# Patient Record
Sex: FEMALE | Race: OTHER | URBAN - METROPOLITAN AREA
[De-identification: names, ages, dates, MRNs, and addresses within clinical notes are randomized per-mention and may not be internally consistent; named-entity substitution may affect disease eponyms.]

---

## 2017-09-25 NOTE — PATIENT DISCUSSION
Discussed the risks/benefits of laser capsulotomy. Laser recommended. Follow for increased symptoms.

## 2018-10-10 NOTE — PROCEDURE NOTE: CLINICAL
PROCEDURE NOTE: Excision Chalazion, Single Left Upper Lid. Diagnosis: Chalazion. Prior to treatment, the risks/benefits/alternatives were discussed. The patient wished to proceed with procedure. After the risks, benefits, and alternatives to the procedure were discussed with the patient, informed consent was obtained. The patient, the procedure, and the correct site were identified beforehand. Local anesthesia was applied and the lid was prepped. The lid was clamped exposing the posterior surface on the eyelid. The chalazion was incised and removed with a curette. Bleeding was controlled and the clamp was removed. The patient tolerated the procedure well and left the room in good condition. Post procedure instructions given. John Knott PROCEDURE NOTE: Chalazion Injection Left Upper Lid. Diagnosis: Chalazion. Prior to treatment, the risks/benefits/alternatives were discussed. The patient wished to proceed with procedure. Site of Injection: *. Kenalog *units injected. Patient tolerated procedure well. There were no complications. Post procedure instructions given. John Knott

## 2019-01-14 ENCOUNTER — OFFICE VISIT (OUTPATIENT)
Dept: ORTHOPEDIC SURGERY | Facility: CLINIC | Age: 69
End: 2019-01-14

## 2019-01-14 VITALS
RESPIRATION RATE: 16 BRPM | WEIGHT: 224 LBS | BODY MASS INDEX: 36 KG/M2 | HEIGHT: 66 IN | SYSTOLIC BLOOD PRESSURE: 146 MMHG | DIASTOLIC BLOOD PRESSURE: 84 MMHG | OXYGEN SATURATION: 99 % | TEMPERATURE: 95.8 F | HEART RATE: 74 BPM

## 2019-01-14 DIAGNOSIS — M12.811 ROTATOR CUFF ARTHROPATHY OF RIGHT SHOULDER: ICD-10-CM

## 2019-01-14 DIAGNOSIS — G89.29 CHRONIC RIGHT SHOULDER PAIN: ICD-10-CM

## 2019-01-14 DIAGNOSIS — M25.511 CHRONIC RIGHT SHOULDER PAIN: ICD-10-CM

## 2019-01-14 DIAGNOSIS — M19.011 PRIMARY OSTEOARTHRITIS OF RIGHT SHOULDER: Primary | ICD-10-CM

## 2019-01-14 DIAGNOSIS — M75.01 ADHESIVE CAPSULITIS OF RIGHT SHOULDER: ICD-10-CM

## 2019-01-14 PROBLEM — E66.01 SEVERE OBESITY (HCC): Status: ACTIVE | Noted: 2019-01-14

## 2019-01-14 RX ORDER — HYDROCHLOROTHIAZIDE 25 MG/1
TABLET ORAL
Refills: 1 | COMMUNITY
Start: 2019-01-05

## 2019-01-14 RX ORDER — BUPIVACAINE HYDROCHLORIDE 2.5 MG/ML
4 INJECTION, SOLUTION EPIDURAL; INFILTRATION; INTRACAUDAL ONCE
Qty: 4 ML | Refills: 0
Start: 2019-01-14 | End: 2019-01-14

## 2019-01-14 RX ORDER — ENALAPRIL MALEATE 20 MG/1
TABLET ORAL
Refills: 1 | COMMUNITY
Start: 2019-01-05

## 2019-01-14 RX ORDER — BETAMETHASONE SODIUM PHOSPHATE AND BETAMETHASONE ACETATE 3; 3 MG/ML; MG/ML
6 INJECTION, SUSPENSION INTRA-ARTICULAR; INTRALESIONAL; INTRAMUSCULAR; SOFT TISSUE ONCE
Qty: 0.5 ML | Refills: 0
Start: 2019-01-14 | End: 2019-01-14

## 2019-01-14 RX ORDER — LEVOTHYROXINE SODIUM 175 UG/1
175 TABLET ORAL
COMMUNITY

## 2019-01-14 NOTE — PROGRESS NOTES
Patient: Kyaw Molina                MRN: [id number]       SSN: [id number] YOB: 1950        AGE: 70 y.o. SEX: female PCP: No primary care provider on file. 01/14/19 Chief Complaint Patient presents with  Shoulder Pain Right shoulder pain HISTORY:  Kyaw Molina is a 70 y.o. female who is seen for right shoulder pain. She has been experiencing worsening shoulder pain for the past few years. She reports no h/o injury. She has not sought treatment recently since she just moved to this area from [place] and she has been doing a lot traveling. She notes shoulder pain with overhead activities and at night. She is mostly left handed. She reports difficulty with everyday activities. She complains of using her shoulder for using a computer mouse with her right hand. Her shoulder range of motion has decreased and her pain has been increasing. She has a h/o right TKR 12+ years ago and left TKR 6+ years ago and is doing well. Pain Assessment  1/14/2019 Location of Pain Shoulder Location Modifiers Right Severity of Pain 5 Quality of Pain Dull;Aching Duration of Pain Persistent Frequency of Pain Constant Aggravating Factors Stretching;Straightening; Other (Comment) Aggravating Factors Comment Using right arm; \"I have limited range of motion\" Limiting Behavior Yes Relieving Factors Nothing Result of Injury No  
 
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 Does international traveling. She is going to [place], [place] and [place] this year. Her  has diabetic neuropathy so she does most of the driving. She travels to other countries with a traveling agency. She is a . She enjoyed a photo safari in [facility] a few years ago. She lives with her  in [place]. She has one son in  and grandchildren in [place]. Current weight is 224 pounds. She is 5'5\" tall. No results found for: HBA1C, HGBE8, KOQ5RRWL, SQT2QNHW, AWM6VOYD Weight Metrics 1/14/2019 Weight 224 lb BMI 36.71 kg/m2 Patient Active Problem List  
Diagnosis Code  Severe obesity (HCC) E66.01  
 
REVIEW OF SYSTEMS: All Below are Negative except: See HPI Constitutional: negative for fever, chills, and weight loss. Cardiovascular: negative for chest pain, claudication, leg swelling, SOB, LAUREANO Gastrointestinal: Negative for pain, N/V/C/D, Blood in stool or urine, dysuria,  hematuria, incontinence, pelvic pain. Musculoskeletal: See HPI Neurological: Negative for dizziness and weakness. Negative for headaches, Visual changes, confusion, seizures Phychiatric/Behavioral: Negative for depression, memory loss, substance  abuse. Extremities: Negative for hair changes, rash, or skin lesion changes. Hematologic: Negative for bleeding problems, bruising, pallor or swollen lymph  nodes Peripheral Vascular: No calf pain, no circulation deficits. Social History Socioeconomic History  Marital status: UNKNOWN Spouse name: Not on file  Number of children: Not on file  Years of education: Not on file  Highest education level: Not on file Social Needs  Financial resource strain: Not on file  Food insecurity - worry: Not on file  Food insecurity - inability: Not on file  Transportation needs - medical: Not on file  Transportation needs - non-medical: Not on file Occupational History  Not on file Tobacco Use  Smoking status: Never Smoker  Smokeless tobacco: Never Used Substance and Sexual Activity  Alcohol use: Yes Alcohol/week: 0.6 oz Types: 1 Glasses of wine per week Frequency: Monthly or less Binge frequency: Never  Drug use: Not on file  Sexual activity: Not Currently Other Topics Concern  Not on file Social History Narrative  Not on file No Known Allergies Current Outpatient Medications Medication Sig  
 hydroCHLOROthiazide (HYDRODIURIL) 25 mg tablet TK 1 T PO ONCE A DAY  enalapril (VASOTEC) 20 mg tablet TK 1 T PO ONCE A DAY  levothyroxine (SYNTHROID) 175 mcg tablet Take 175 mcg by mouth Daily (before breakfast).  betamethasone (CELESTONE SOLUSPAN) 6 mg/mL injection 1 mL by Intra artICUlar route once for 1 dose.  bupivacaine, PF, (MARCAINE, PF,) 0.25 % (2.5 mg/mL) injection 4 mL by Intra artICUlar route once for 1 dose. No current facility-administered medications for this visit. PHYSICAL EXAMINATION: 
Visit Vitals /84 (BP 1 Location: Left arm, BP Patient Position: Sitting) Pulse 74 Temp 95.8 °F (35.4 °C) (Oral) Resp 16 Ht 5' 5.5\" (1.664 m) Wt 224 lb (101.6 kg) LMP  (LMP Unknown) SpO2 99% BMI 36.71 kg/m² ORTHO EXAMINATION: 
Examination Right shoulder Left shoulder Skin Intact Intact Effusion - - Biceps deformity - - Atrophy - -  
AC joint tenderness - - Acromial tenderness + + Biceps tenderness - - Forward flexion/Elevation  90 Active abduction  90 External rotation ROM 30 0 Internal rotation ROM 90 5 Apprehension - - Impingement - - Drop Arm Test - - Neurovascular Intact Intact PROCEDURE:  After discussing treatment options, patient's right shoulder was injected with 4 cc Marcaine and 1/2 cc Celestone. Chart reviewed for the following: 
 Rufino Gloria MD, have reviewed the History, Physical and updated the Allergic reactions for [facility] TIME OUT performed immediately prior to start of procedure: 
Rufino Gloria MD, have performed the following reviews on [facility] prior to the start of the procedure: 
         
* Patient was identified by name and date of birth * Agreement on procedure being performed was verified * Risks and Benefits explained to the patient * Procedure site verified and marked as necessary * Patient was positioned for comfort * Consent was obtained Time: 1:17 PM  
 
Date of procedure: 1/14/2019 Procedure performed by:  Gradie Aase, MD 
 
Ms. Goodman tolerated the procedure well with no complications. RADIOGRAPHS: 
XR RIGHT SHOULDER 1/14/19 [facility] 1/7/19 1. Moderate to severe degenerative changes at the right glenohumeral joint 2. The right humeral head is somewhat high riding, as can be seen in rotator cuff pathology. 3. Mild to moderate degenerative changes are noted at the [facility] joint 
-I have independently reviewed these images during this office visit. -Dr. Mason Duncan IMPRESSION:  Three views - No fractures, severe acromioclavicular narrowing, severe glenohumeral narrowing, goatbeard calcific densities. High rising humeral head c/w rotator cuff arthropathy IMPRESSION:   
  ICD-10-CM ICD-9-CM 1. Primary osteoarthritis of right shoulder M19.011 715.11 betamethasone (CELESTONE SOLUSPAN) 6 mg/mL injection BETAMETHASONE ACETATE & SODIUM PHOSPHATE INJECTION 3 MG EA.  
   DRAIN/INJECT LARGE JOINT/BURSA  
   bupivacaine, PF, (MARCAINE, PF,) 0.25 % (2.5 mg/mL) injection 2. Chronic right shoulder pain M25.511 719.41 betamethasone (CELESTONE SOLUSPAN) 6 mg/mL injection G89.29 338.29 BETAMETHASONE ACETATE & SODIUM PHOSPHATE INJECTION 3 MG EA.  
   DRAIN/INJECT LARGE JOINT/BURSA  
   bupivacaine, PF, (MARCAINE, PF,) 0.25 % (2.5 mg/mL) injection 3. Adhesive capsulitis of right shoulder M75.01 726.0 betamethasone (CELESTONE SOLUSPAN) 6 mg/mL injection BETAMETHASONE ACETATE & SODIUM PHOSPHATE INJECTION 3 MG EA.  
   DRAIN/INJECT LARGE JOINT/BURSA  
   bupivacaine, PF, (MARCAINE, PF,) 0.25 % (2.5 mg/mL) injection 4. Rotator cuff arthropathy of right shoulder M12.811 716.81 betamethasone (CELESTONE SOLUSPAN) 6 mg/mL injection BETAMETHASONE ACETATE & SODIUM PHOSPHATE INJECTION 3 MG EA.  
   DRAIN/INJECT LARGE JOINT/BURSA  
   bupivacaine, PF, (MARCAINE, PF,) 0.25 % (2.5 mg/mL) injection PLAN:  After discussing treatment options, patient's right shoulder was injected with 4 cc Marcaine and 1/2 cc Celestone. She will follow up with Dr. Marielle Rodriguez for orthopedic shoulder surgery consultation. We discussed possible need for reverse shoulder arthroplasty at some time in the future if pain continues.   
 
Scribed by Sumeet Romero ([address] [place] [address] [address]) as dictated by Kortney Castro MD

## 2019-05-23 ENCOUNTER — IMPORTED ENCOUNTER (OUTPATIENT)
Dept: URBAN - METROPOLITAN AREA CLINIC 1 | Facility: CLINIC | Age: 69
End: 2019-05-23

## 2019-05-23 PROBLEM — H52.03: Noted: 2019-05-23

## 2019-05-23 PROBLEM — H52.4: Noted: 2019-05-23

## 2019-05-23 PROCEDURE — S0620 ROUTINE OPHTHALMOLOGICAL EXA: HCPCS

## 2019-05-23 NOTE — PATIENT DISCUSSION
1.  Hyperopia OU -- Finalized Glasses MRx was given to patient today for corrective spectacles if indicated2. Presbyopia OU 3. Cataracts OU -- Observe 4. Dry Eyes OU -- Recommend the frequent use of OTC AT's BID-QID OU RoutinelyReturn for an appointment in 1 YR for a 40 OU with Dr. Brian Calhoun.

## 2019-05-23 NOTE — PROCEDURE NOTE: SURGICAL
<p>Prior to commencing surgery patient identification, surgical procedure, site, and side were confirmed by Dr. Elias.&nbsp; Following topical proparacaine anesthesia, the patient was positioned at the YAG laser, a contact lens coupled to the cornea of the right eye with methylcellulose and an axial posterior capsulotomy performed without complication using 3.2 Mj x 12. Attention was then turned to the left eye and a contact lens coupled to the cornea of the left eye with methylcellulose and an axial posterior capsulotomy performed without complication using 3.2 Mj x 16. One drop of Alphagan was instilled in both eyes and the patient returned to the holding area having tolerated the procedure well and without complication. </p><p>MRN [id number]</p>

## 2020-07-27 ENCOUNTER — IMPORTED ENCOUNTER (OUTPATIENT)
Dept: URBAN - METROPOLITAN AREA CLINIC 1 | Facility: CLINIC | Age: 70
End: 2020-07-27

## 2020-07-27 PROBLEM — H52.01: Noted: 2020-07-27

## 2020-07-27 PROBLEM — H52.223: Noted: 2020-07-27

## 2020-07-27 PROCEDURE — S0621 ROUTINE OPHTHALMOLOGICAL EXA: HCPCS

## 2020-07-27 NOTE — PATIENT DISCUSSION
1.  Hyperopia OD -- Rx was given for corrective spectacles if indicated. 2.  Astigmatism OU -- 3. Cataracts OU --Return for an appointment in 1 year 40with Dr. Keila Dang. Return for an appointment in 6 months 27 with Dr. Keila Dang.

## 2021-01-26 ENCOUNTER — IMPORTED ENCOUNTER (OUTPATIENT)
Dept: URBAN - METROPOLITAN AREA CLINIC 1 | Facility: CLINIC | Age: 71
End: 2021-01-26

## 2021-01-26 PROBLEM — H25.813: Noted: 2021-01-26

## 2021-01-26 PROBLEM — H01.004: Noted: 2021-01-26

## 2021-01-26 PROBLEM — H04.123: Noted: 2021-01-26

## 2021-01-26 PROBLEM — H01.001: Noted: 2021-01-26

## 2021-01-26 PROCEDURE — 99214 OFFICE O/P EST MOD 30 MIN: CPT

## 2021-01-26 NOTE — PATIENT DISCUSSION
1.  Cataract OU -- No visual/glare complaints noted from patient today non-surgical at this time. The patient was advised to contact us if any change or worsening of vision2. Dry Eyes OU -- Cont the use of ATs BID-QID OU routinely. 3.  Anterior Blepharitis OU -- Daily Hot compresses and lid scrubs were recommended. Patient deferred MRx returns under vision plan. Return for an appointment in July 40 with Dr. Trixie Rangel. Return for an appointment in 1 year 27 with Dr. Trixie Rangel.

## 2021-11-27 NOTE — PATIENT DISCUSSION
Artificial Tears: One drop to both eyes 3-4 times daily. We recommend Systane or Refresh lubricating eye drops which can be found at any pharmacy. 12-Mar-2021

## 2022-01-03 ENCOUNTER — IMPORTED ENCOUNTER (OUTPATIENT)
Dept: URBAN - METROPOLITAN AREA CLINIC 1 | Facility: CLINIC | Age: 72
End: 2022-01-03

## 2022-01-03 PROBLEM — H52.4: Noted: 2022-01-03

## 2022-01-03 PROBLEM — H52.223: Noted: 2022-01-03

## 2022-01-03 PROBLEM — H52.03: Noted: 2022-01-03

## 2022-01-03 PROCEDURE — S0621 ROUTINE OPHTHALMOLOGICAL EXA: HCPCS

## 2022-01-03 NOTE — PATIENT DISCUSSION
1.  Hyperopia w/ Astigmatism OU -- Rx was given for corrective spectacles if indicated. 2.  Presbyopia 3. Cataract OU -- Observe. 4.  Dry Eyes OU -- Recommend ATs TID OU routinely. 5.  Anterior Blepharitis OU -- Recommend daily hot compresses and lid scrubs. Return for an appointment in 6 months 30/[place] with Dr. Luis Daniel Quesada. Return for an appointmetn in 1 year 36 with Dr. Luis Daniel Quesada.

## 2022-04-02 ASSESSMENT — VISUAL ACUITY
OS_SC: 20/20-1
OS_SC: 20/20
OD_SC: 20/30
OD_SC: 20/20-1
OD_GLARE: 20/60
OD_SC: 20/25+2
OS_GLARE: 20/60
OS_CC: J1+
OD_CC: J1+
OS_GLARE: 20/60
OS_CC: J1+
OD_SC: 20/20
OD_CC: J1+
OD_CC: J1-2
OS_SC: 20/40
OS_SC: 20/20-1
OS_CC: J1
OD_GLARE: 20/60

## 2022-04-02 ASSESSMENT — TONOMETRY
OS_IOP_MMHG: 13
OS_IOP_MMHG: 12
OD_IOP_MMHG: 12
OD_IOP_MMHG: 13
OD_IOP_MMHG: 12
OS_IOP_MMHG: 12
OS_IOP_MMHG: 13
OD_IOP_MMHG: 13

## 2022-07-11 ENCOUNTER — COMPREHENSIVE EXAM (OUTPATIENT)
Dept: URBAN - METROPOLITAN AREA CLINIC 1 | Facility: CLINIC | Age: 72
End: 2022-07-11

## 2022-07-11 DIAGNOSIS — H52.4: ICD-10-CM

## 2022-07-11 DIAGNOSIS — H25.813: ICD-10-CM

## 2022-07-11 DIAGNOSIS — H04.123: ICD-10-CM

## 2022-07-11 PROCEDURE — 99214 OFFICE O/P EST MOD 30 MIN: CPT

## 2022-07-11 PROCEDURE — 92015 DETERMINE REFRACTIVE STATE: CPT

## 2022-07-11 ASSESSMENT — VISUAL ACUITY
OS_CC: 20/30-1
OD_BAT: 20/60
OD_CC: 20/25
OS_BAT: 20/60

## 2022-07-11 ASSESSMENT — TONOMETRY
OD_IOP_MMHG: 12
OS_IOP_MMHG: 14

## 2023-07-13 ENCOUNTER — COMPREHENSIVE EXAM (OUTPATIENT)
Dept: URBAN - METROPOLITAN AREA CLINIC 1 | Facility: CLINIC | Age: 73
End: 2023-07-13

## 2023-07-13 DIAGNOSIS — H04.123: ICD-10-CM

## 2023-07-13 DIAGNOSIS — H16.143: ICD-10-CM

## 2023-07-13 PROCEDURE — 92014 COMPRE OPH EXAM EST PT 1/>: CPT

## 2023-07-13 ASSESSMENT — VISUAL ACUITY
OD_CC: J1+
OS_BAT: 20/60
OD_CC: 20/25
OS_CC: 20/30-1
OD_BAT: 20/60
OS_CC: J1+

## 2023-07-13 ASSESSMENT — TONOMETRY
OS_IOP_MMHG: 10
OD_IOP_MMHG: 11

## 2023-12-04 ENCOUNTER — COMPREHENSIVE EXAM (OUTPATIENT)
Dept: URBAN - METROPOLITAN AREA CLINIC 1 | Facility: CLINIC | Age: 73
End: 2023-12-04

## 2023-12-04 DIAGNOSIS — H52.223: ICD-10-CM

## 2023-12-04 DIAGNOSIS — Z01.00: ICD-10-CM

## 2023-12-04 DIAGNOSIS — H52.03: ICD-10-CM

## 2023-12-04 DIAGNOSIS — H52.4: ICD-10-CM

## 2023-12-04 PROCEDURE — 92015 DETERMINE REFRACTIVE STATE: CPT

## 2023-12-04 PROCEDURE — 92014 COMPRE OPH EXAM EST PT 1/>: CPT

## 2023-12-04 ASSESSMENT — VISUAL ACUITY
OS_CC: 20/25-1
OD_CC: J1+
OS_CC: J1+
OD_CC: 20/20-1

## 2023-12-04 ASSESSMENT — TONOMETRY
OS_IOP_MMHG: 12
OD_IOP_MMHG: 11

## 2024-06-05 ENCOUNTER — COMPREHENSIVE EXAM (OUTPATIENT)
Dept: URBAN - METROPOLITAN AREA CLINIC 1 | Facility: CLINIC | Age: 74
End: 2024-06-05

## 2024-06-05 DIAGNOSIS — H25.813: ICD-10-CM

## 2024-06-05 DIAGNOSIS — H16.143: ICD-10-CM

## 2024-06-05 DIAGNOSIS — H01.021: ICD-10-CM

## 2024-06-05 DIAGNOSIS — H01.024: ICD-10-CM

## 2024-06-05 DIAGNOSIS — H04.123: ICD-10-CM

## 2024-06-05 PROCEDURE — 99214 OFFICE O/P EST MOD 30 MIN: CPT

## 2024-06-05 ASSESSMENT — VISUAL ACUITY
OS_BAT: 20/60
OD_BAT: 20/60
OS_CC: 20/25
OD_CC: J1+
OD_CC: 20/30+1
OS_CC: J1+

## 2024-06-05 ASSESSMENT — TONOMETRY
OS_IOP_MMHG: 13
OD_IOP_MMHG: 12

## 2024-11-05 ENCOUNTER — COMPREHENSIVE EXAM (OUTPATIENT)
Dept: URBAN - METROPOLITAN AREA CLINIC 1 | Facility: CLINIC | Age: 74
End: 2024-11-05

## 2024-11-05 DIAGNOSIS — H52.223: ICD-10-CM

## 2024-11-05 DIAGNOSIS — H52.4: ICD-10-CM

## 2024-11-05 DIAGNOSIS — H52.01: ICD-10-CM

## 2024-11-05 DIAGNOSIS — Z01.00: ICD-10-CM

## 2024-11-05 PROCEDURE — 92015 DETERMINE REFRACTIVE STATE: CPT

## 2024-11-05 PROCEDURE — 92014 COMPRE OPH EXAM EST PT 1/>: CPT

## 2025-02-14 ENCOUNTER — FOLLOW UP (OUTPATIENT)
Age: 75
End: 2025-02-14

## 2025-02-14 DIAGNOSIS — H01.021: ICD-10-CM

## 2025-02-14 DIAGNOSIS — H01.024: ICD-10-CM

## 2025-02-14 DIAGNOSIS — H25.813: ICD-10-CM

## 2025-02-14 PROCEDURE — 92015 DETERMINE REFRACTIVE STATE: CPT

## 2025-02-14 PROCEDURE — 99213 OFFICE O/P EST LOW 20 MIN: CPT

## 2025-03-18 ENCOUNTER — COMPREHENSIVE EXAM (OUTPATIENT)
Age: 75
End: 2025-03-18

## 2025-03-18 VITALS
SYSTOLIC BLOOD PRESSURE: 184 MMHG | WEIGHT: 220 LBS | DIASTOLIC BLOOD PRESSURE: 86 MMHG | HEIGHT: 63.5 IN | HEART RATE: 79 BPM | BODY MASS INDEX: 38.5 KG/M2

## 2025-03-18 DIAGNOSIS — H25.813: ICD-10-CM

## 2025-03-18 PROCEDURE — 92025 CPTRIZED CORNEAL TOPOGRAPHY: CPT

## 2025-03-18 PROCEDURE — 99213 OFFICE O/P EST LOW 20 MIN: CPT

## 2025-03-18 PROCEDURE — 92136 OPHTHALMIC BIOMETRY: CPT

## 2025-04-16 ENCOUNTER — SURGERY/PROCEDURE (OUTPATIENT)
Age: 75
End: 2025-04-16

## 2025-04-16 DIAGNOSIS — H25.811: ICD-10-CM

## 2025-04-16 PROCEDURE — 99199PCV PROF CUSTOM VISION PACKAGE

## 2025-04-16 PROCEDURE — 66999LNSR LENSAR LASER FOR CAT SX

## 2025-04-16 PROCEDURE — 66984CV REMOVE CATARACT, INSERT LENS, CUSTOM VISION

## 2025-04-16 PROCEDURE — 65772LRI LRI DURING CAT SX

## 2025-04-17 ENCOUNTER — POST-OP (OUTPATIENT)
Age: 75
End: 2025-04-17

## 2025-04-17 DIAGNOSIS — Z96.1: ICD-10-CM

## 2025-04-22 ENCOUNTER — POST OP/EVAL OF SECOND EYE (OUTPATIENT)
Age: 75
End: 2025-04-22

## 2025-04-22 DIAGNOSIS — Z96.1: ICD-10-CM

## 2025-04-22 DIAGNOSIS — H25.812: ICD-10-CM

## 2025-04-22 PROCEDURE — 92136 OPHTHALMIC BIOMETRY: CPT | Mod: 26

## 2025-04-22 PROCEDURE — 92025 CPTRIZED CORNEAL TOPOGRAPHY: CPT | Mod: NC

## 2025-04-30 ENCOUNTER — SURGERY/PROCEDURE (OUTPATIENT)
Age: 75
End: 2025-04-30

## 2025-04-30 DIAGNOSIS — H25.812: ICD-10-CM

## 2025-04-30 PROCEDURE — 99199PCV PROF CUSTOM VISION PACKAGE

## 2025-04-30 PROCEDURE — 66999LNSR LENSAR LASER FOR CAT SX

## 2025-04-30 PROCEDURE — 65772LRI LRI DURING CAT SX

## 2025-04-30 PROCEDURE — 66984CV REMOVE CATARACT, INSERT LENS, CUSTOM VISION: Mod: 79,LT

## 2025-05-01 ENCOUNTER — POST-OP (OUTPATIENT)
Age: 75
End: 2025-05-01

## 2025-05-01 DIAGNOSIS — Z96.1: ICD-10-CM

## 2025-05-02 ENCOUNTER — POST-OP (OUTPATIENT)
Age: 75
End: 2025-05-02

## 2025-05-02 DIAGNOSIS — Z96.1: ICD-10-CM

## 2025-05-23 ENCOUNTER — POST-OP (OUTPATIENT)
Age: 75
End: 2025-05-23

## 2025-05-23 DIAGNOSIS — Z96.1: ICD-10-CM
